# Patient Record
Sex: FEMALE | Race: BLACK OR AFRICAN AMERICAN | NOT HISPANIC OR LATINO | URBAN - METROPOLITAN AREA
[De-identification: names, ages, dates, MRNs, and addresses within clinical notes are randomized per-mention and may not be internally consistent; named-entity substitution may affect disease eponyms.]

---

## 2022-04-30 ENCOUNTER — TELEPHONE ENCOUNTER (OUTPATIENT)
Dept: URBAN - METROPOLITAN AREA CLINIC 121 | Facility: CLINIC | Age: 67
End: 2022-04-30

## 2022-05-01 ENCOUNTER — TELEPHONE ENCOUNTER (OUTPATIENT)
Dept: URBAN - METROPOLITAN AREA CLINIC 121 | Facility: CLINIC | Age: 67
End: 2022-05-01

## 2022-05-01 RX ORDER — POLYETHYLENE GLYCOL 3350, SODIUM SULFATE, SODIUM CHLORIDE, POTASSIUM CHLORIDE, ASCORBIC ACID, SODIUM ASCORBATE 7.5-2.691G
MIX AND USE AS DIRECTED KIT ORAL
Status: ACTIVE | COMMUNITY
Start: 2012-12-05

## 2022-05-01 RX ORDER — CHLORTHALIDONE 25 MG/1
QD TABLET ORAL
Status: ACTIVE | COMMUNITY
Start: 2012-12-05

## 2022-05-01 RX ORDER — LISINOPRIL 40 MG/1
BID TABLET ORAL
Status: ACTIVE | COMMUNITY
Start: 2012-12-05

## 2022-05-01 RX ORDER — TRAMADOL HYDROCHLORIDE 50 MG/1
PRN TABLET, FILM COATED ORAL
Status: ACTIVE | COMMUNITY
Start: 2012-12-05

## 2024-06-03 ENCOUNTER — LAB OUTSIDE AN ENCOUNTER (OUTPATIENT)
Dept: URBAN - METROPOLITAN AREA CLINIC 27 | Facility: CLINIC | Age: 69
End: 2024-06-03

## 2024-06-03 ENCOUNTER — OFFICE VISIT (OUTPATIENT)
Dept: URBAN - METROPOLITAN AREA CLINIC 27 | Facility: CLINIC | Age: 69
End: 2024-06-03
Payer: COMMERCIAL

## 2024-06-03 ENCOUNTER — DASHBOARD ENCOUNTERS (OUTPATIENT)
Age: 69
End: 2024-06-03

## 2024-06-03 VITALS
SYSTOLIC BLOOD PRESSURE: 112 MMHG | HEART RATE: 72 BPM | WEIGHT: 262 LBS | DIASTOLIC BLOOD PRESSURE: 66 MMHG | BODY MASS INDEX: 44.73 KG/M2 | HEIGHT: 64 IN

## 2024-06-03 DIAGNOSIS — I10 ESSENTIAL (PRIMARY) HYPERTENSION: ICD-10-CM

## 2024-06-03 DIAGNOSIS — D64.9 MILD ANEMIA: ICD-10-CM

## 2024-06-03 PROBLEM — 271737000: Status: ACTIVE | Noted: 2024-06-03

## 2024-06-03 PROCEDURE — 99204 OFFICE O/P NEW MOD 45 MIN: CPT | Performed by: INTERNAL MEDICINE

## 2024-06-03 RX ORDER — LISINOPRIL 40 MG/1
BID TABLET ORAL
Status: ACTIVE | COMMUNITY
Start: 2012-12-05

## 2024-06-03 RX ORDER — CHLORTHALIDONE 25 MG/1
QD TABLET ORAL
Status: ACTIVE | COMMUNITY
Start: 2012-12-05

## 2024-06-03 RX ORDER — TRAMADOL HYDROCHLORIDE 50 MG/1
PRN TABLET, FILM COATED ORAL
Status: ACTIVE | COMMUNITY
Start: 2012-12-05

## 2024-06-03 RX ORDER — SOD SULF/POT CHLORIDE/MAG SULF 1.479 G
12 TABLETS THE FIRST DOSE THE EVENING BEFORE AND SECOND DOSE THE MORNING OF COLONOSCOPY TABLET ORAL TWICE A DAY
Qty: 24 TABLET | OUTPATIENT
Start: 2024-06-03

## 2024-06-03 RX ORDER — POLYETHYLENE GLYCOL 3350, SODIUM SULFATE, SODIUM CHLORIDE, POTASSIUM CHLORIDE, ASCORBIC ACID, SODIUM ASCORBATE 7.5-2.691G
MIX AND USE AS DIRECTED KIT ORAL
Status: ACTIVE | COMMUNITY
Start: 2012-12-05

## 2024-06-03 NOTE — HPI-TODAY'S VISIT:
Mrs. Olvera presents today per referral by TONI Porter for evaluation of anemia.  She was recently noted to have a hemoglobin of 9.9 and hematocrit of 35.  The patient actually denies seeing any blood in her stool.  Her last colonoscopy with me in 2012 was unremarkable.  Otherwise, she has no other GI complaints.  Her medical history is significant for hypertension and hyperlipidemia.

## 2024-06-04 ENCOUNTER — OUT OF OFFICE VISIT (OUTPATIENT)
Dept: URBAN - METROPOLITAN AREA SURGERY CENTER 7 | Facility: SURGERY CENTER | Age: 69
End: 2024-06-04
Payer: COMMERCIAL

## 2024-06-04 ENCOUNTER — CLAIMS CREATED FROM THE CLAIM WINDOW (OUTPATIENT)
Dept: URBAN - METROPOLITAN AREA CLINIC 4 | Facility: CLINIC | Age: 69
End: 2024-06-04
Payer: COMMERCIAL

## 2024-06-04 DIAGNOSIS — K44.9 HIATAL HERNIA: ICD-10-CM

## 2024-06-04 DIAGNOSIS — R68.89 ERYTHEMATOUS MUCOSA: ICD-10-CM

## 2024-06-04 DIAGNOSIS — K31.A0 GASTRIC INTESTINAL METAPLASIA, UNSPECIFIED: ICD-10-CM

## 2024-06-04 DIAGNOSIS — K29.60 OTHER GASTRITIS WITHOUT BLEEDING: ICD-10-CM

## 2024-06-04 DIAGNOSIS — D50.9 ANEMIA: ICD-10-CM

## 2024-06-04 DIAGNOSIS — D50.9 ANEMIA, IRON DEFICIENCY: ICD-10-CM

## 2024-06-04 PROCEDURE — 00731 ANES UPR GI NDSC PX NOS: CPT | Performed by: ANESTHESIOLOGIST ASSISTANT

## 2024-06-04 PROCEDURE — 88305 TISSUE EXAM BY PATHOLOGIST: CPT | Performed by: PATHOLOGY

## 2024-06-04 PROCEDURE — 88342 IMHCHEM/IMCYTCHM 1ST ANTB: CPT | Performed by: PATHOLOGY

## 2024-06-04 PROCEDURE — 43239 EGD BIOPSY SINGLE/MULTIPLE: CPT | Performed by: INTERNAL MEDICINE

## 2024-06-04 PROCEDURE — G8907 PT DOC NO EVENTS ON DISCHARG: HCPCS | Performed by: INTERNAL MEDICINE

## 2024-06-04 PROCEDURE — 00731 ANES UPR GI NDSC PX NOS: CPT | Performed by: ANESTHESIOLOGY

## 2024-06-04 RX ORDER — LISINOPRIL 40 MG/1
BID TABLET ORAL
Status: ACTIVE | COMMUNITY
Start: 2012-12-05

## 2024-06-04 RX ORDER — POLYETHYLENE GLYCOL 3350, SODIUM SULFATE, SODIUM CHLORIDE, POTASSIUM CHLORIDE, ASCORBIC ACID, SODIUM ASCORBATE 7.5-2.691G
MIX AND USE AS DIRECTED KIT ORAL
Status: ACTIVE | COMMUNITY
Start: 2012-12-05

## 2024-06-04 RX ORDER — CHLORTHALIDONE 25 MG/1
QD TABLET ORAL
Status: ACTIVE | COMMUNITY
Start: 2012-12-05

## 2024-06-04 RX ORDER — TRAMADOL HYDROCHLORIDE 50 MG/1
PRN TABLET, FILM COATED ORAL
Status: ACTIVE | COMMUNITY
Start: 2012-12-05

## 2024-06-04 RX ORDER — SOD SULF/POT CHLORIDE/MAG SULF 1.479 G
12 TABLETS THE FIRST DOSE THE EVENING BEFORE AND SECOND DOSE THE MORNING OF COLONOSCOPY TABLET ORAL TWICE A DAY
Qty: 24 TABLET | Status: ACTIVE | COMMUNITY
Start: 2024-06-03

## 2024-06-08 ENCOUNTER — LAB OUTSIDE AN ENCOUNTER (OUTPATIENT)
Dept: URBAN - METROPOLITAN AREA CLINIC 27 | Facility: CLINIC | Age: 69
End: 2024-06-08

## 2024-06-12 ENCOUNTER — OFFICE VISIT (OUTPATIENT)
Dept: URBAN - METROPOLITAN AREA SURGERY CENTER 7 | Facility: SURGERY CENTER | Age: 69
End: 2024-06-12
Payer: COMMERCIAL

## 2024-06-12 ENCOUNTER — CLAIMS CREATED FROM THE CLAIM WINDOW (OUTPATIENT)
Dept: URBAN - METROPOLITAN AREA CLINIC 4 | Facility: CLINIC | Age: 69
End: 2024-06-12
Payer: COMMERCIAL

## 2024-06-12 DIAGNOSIS — D12.4 BENIGN NEOPLASM OF DESCENDING COLON: ICD-10-CM

## 2024-06-12 DIAGNOSIS — D12.0 ADENOMATOUS POLYP OF CECUM: ICD-10-CM

## 2024-06-12 DIAGNOSIS — D50.9 ANEMIA: ICD-10-CM

## 2024-06-12 DIAGNOSIS — D12.8 ADENOMATOUS POLYP OF RECTUM: ICD-10-CM

## 2024-06-12 DIAGNOSIS — D12.8 BENIGN NEOPLASM OF RECTUM: ICD-10-CM

## 2024-06-12 DIAGNOSIS — Z86.010 PERSONAL HISTORY OF COLON POLYPS: ICD-10-CM

## 2024-06-12 DIAGNOSIS — D12.4 ADENOMA OF DESCENDING COLON: ICD-10-CM

## 2024-06-12 DIAGNOSIS — K63.89 OTHER SPECIFIED DISEASES OF INTESTINE: ICD-10-CM

## 2024-06-12 DIAGNOSIS — D12.4 ADENOMATOUS POLYP OF DESCENDING COLON: ICD-10-CM

## 2024-06-12 DIAGNOSIS — K64.0 GRADE I INTERNAL HEMORRHOIDS: ICD-10-CM

## 2024-06-12 PROCEDURE — 88305 TISSUE EXAM BY PATHOLOGIST: CPT | Performed by: PATHOLOGY

## 2024-06-12 PROCEDURE — 45385 COLONOSCOPY W/LESION REMOVAL: CPT | Performed by: INTERNAL MEDICINE

## 2024-06-12 PROCEDURE — 45380 COLONOSCOPY AND BIOPSY: CPT | Performed by: INTERNAL MEDICINE

## 2024-06-12 PROCEDURE — 00811 ANES LWR INTST NDSC NOS: CPT | Performed by: NURSE ANESTHETIST, CERTIFIED REGISTERED

## 2024-06-12 RX ORDER — TRAMADOL HYDROCHLORIDE 50 MG/1
PRN TABLET, FILM COATED ORAL
Status: ACTIVE | COMMUNITY
Start: 2012-12-05

## 2024-06-12 RX ORDER — LISINOPRIL 40 MG/1
BID TABLET ORAL
Status: ACTIVE | COMMUNITY
Start: 2012-12-05

## 2024-06-12 RX ORDER — SOD SULF/POT CHLORIDE/MAG SULF 1.479 G
12 TABLETS THE FIRST DOSE THE EVENING BEFORE AND SECOND DOSE THE MORNING OF COLONOSCOPY TABLET ORAL TWICE A DAY
Qty: 24 TABLET | Status: ACTIVE | COMMUNITY
Start: 2024-06-03

## 2024-06-12 RX ORDER — CHLORTHALIDONE 25 MG/1
QD TABLET ORAL
Status: ACTIVE | COMMUNITY
Start: 2012-12-05

## 2024-06-12 RX ORDER — POLYETHYLENE GLYCOL 3350, SODIUM SULFATE, SODIUM CHLORIDE, POTASSIUM CHLORIDE, ASCORBIC ACID, SODIUM ASCORBATE 7.5-2.691G
MIX AND USE AS DIRECTED KIT ORAL
Status: ACTIVE | COMMUNITY
Start: 2012-12-05

## 2024-06-17 PROBLEM — 87522002: Status: ACTIVE | Noted: 2024-06-17

## 2024-06-19 LAB — OCCULT BLOOD, FECAL, IA: (no result)

## 2024-07-09 ENCOUNTER — OFFICE VISIT (OUTPATIENT)
Dept: URBAN - METROPOLITAN AREA CLINIC 26 | Facility: CLINIC | Age: 69
End: 2024-07-09
Payer: MEDICARE

## 2024-07-09 DIAGNOSIS — D50.9 ANEMIA: ICD-10-CM

## 2024-07-09 PROCEDURE — 91110 GI TRC IMG INTRAL ESOPH-ILE: CPT | Performed by: INTERNAL MEDICINE
